# Patient Record
Sex: FEMALE
[De-identification: names, ages, dates, MRNs, and addresses within clinical notes are randomized per-mention and may not be internally consistent; named-entity substitution may affect disease eponyms.]

---

## 2020-09-29 PROBLEM — Z00.00 ENCOUNTER FOR PREVENTIVE HEALTH EXAMINATION: Status: ACTIVE | Noted: 2020-09-29

## 2020-09-30 ENCOUNTER — APPOINTMENT (OUTPATIENT)
Dept: ORTHOPEDIC SURGERY | Facility: CLINIC | Age: 50
End: 2020-09-30
Payer: MEDICAID

## 2020-09-30 VITALS
DIASTOLIC BLOOD PRESSURE: 76 MMHG | TEMPERATURE: 97.6 F | SYSTOLIC BLOOD PRESSURE: 110 MMHG | HEIGHT: 62 IN | HEART RATE: 70 BPM | WEIGHT: 133 LBS | BODY MASS INDEX: 24.48 KG/M2

## 2020-09-30 DIAGNOSIS — S46.912A STRAIN OF UNSPECIFIED MUSCLE, FASCIA AND TENDON AT SHOULDER AND UPPER ARM LEVEL, LEFT ARM, INITIAL ENCOUNTER: ICD-10-CM

## 2020-09-30 DIAGNOSIS — M25.512 PAIN IN LEFT SHOULDER: ICD-10-CM

## 2020-09-30 PROCEDURE — 99203 OFFICE O/P NEW LOW 30 MIN: CPT

## 2020-09-30 PROCEDURE — 73030 X-RAY EXAM OF SHOULDER: CPT | Mod: LT

## 2020-09-30 NOTE — DISCUSSION/SUMMARY
[de-identified] : This is a 49-year-old female with a left shoulder parascapular strain.  The treatment is conservative with anti-inflammatory medications and physical therapy.  I prescribed meloxicam after discussing all of the side effects.  She should take it for a minimum of 10 days and as needed thereafter.  I have also prescribed physical therapy to work on local stretching, range of motion, and strengthening.  If her symptoms persist she may return in 6 weeks at which point I may consider an MRI.  She otherwise has no activity limitations.

## 2020-09-30 NOTE — HISTORY OF PRESENT ILLNESS
[FreeTextEntry1] : This is a 49-year-old female with no past medical history who is presenting for evaluation of 6 months of progressive left shoulder pain.  She says the pain began 4/2020 without any trauma or twisting injury.  The pain is localized to the posterior aspect of her left shoulder/neck area and does not radiate past her elbow.  The pain has been getting worse during this time, now 6 out of 10 at rest and up to 10 out of 10 with active abduction.  She has tried ibuprofen at a low dose with minimal relief.  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  She denies any constitutional symptoms.

## 2020-09-30 NOTE — PHYSICAL EXAM
[FreeTextEntry1] : General: well nourished, in no acute distress, alert and oriented to person, place and time.\par Psychiatric: normal mood and affect, no abnormal movements or speech patterns.\par Eyes: vision intact without deficits, sclera and conjunctiva were normal, pupils were equal in size. \par ENT: Ears and nose were normal in appearance. No thyromegaly.\par Lymph: no enlarged nodes, no lymphedema in extremity.\par Respiratory: Normal respiratory rhythm and effort. No wheezing detected without auscultation. No shortness of breath or respiratory distress.\par Cardiac: no cardiac related leg swelling, 2+ peripheral pulses.\par Neurology: normal gross sensation in extremities to light touch.\par Abdomen: soft, non-tender, tympanic, no masses.\par \par LUE:\par \par Skin CDI. No swelling or ecchymosis.\par M/U/R/AIN/PIN 5/5. M/U/R/Ax SILT. +2 Rad pulse. Compartments soft. \par No palpable masses or lymphedema.\par +TTP over left superior parascapular musculature. \par No TTP over AC joint.\par No TTP over lateral epicondyle. No pain with resisted wrist extension.\par Full painless supination/pronation w/o blocks.\par Shoulder ROM: Active abduction to 150 degrees but with pain. IR to L5.\par Negative empty can, Ewing, Yergason's, Johnston's. \par Full strength in supraspinatous, infraspinatous, teres minor, and subscapularis muscles. \par

## 2020-09-30 NOTE — DATA REVIEWED
[de-identified] : 9/30/2020–left shoulder x-rays (AP, axillary, scapular Y): There are no fractures, dislocations, or osseous lesions.  There are no glenohumeral degenerative changes.

## 2021-01-04 ENCOUNTER — RX RENEWAL (OUTPATIENT)
Age: 51
End: 2021-01-04

## 2021-03-18 ENCOUNTER — RX RENEWAL (OUTPATIENT)
Age: 51
End: 2021-03-18

## 2021-03-18 RX ORDER — MELOXICAM 15 MG/1
15 TABLET ORAL
Qty: 30 | Refills: 1 | Status: ACTIVE | COMMUNITY
Start: 2020-09-30 | End: 1900-01-01